# Patient Record
Sex: FEMALE | Race: WHITE | NOT HISPANIC OR LATINO | Employment: UNEMPLOYED | URBAN - METROPOLITAN AREA
[De-identification: names, ages, dates, MRNs, and addresses within clinical notes are randomized per-mention and may not be internally consistent; named-entity substitution may affect disease eponyms.]

---

## 2020-10-27 ENCOUNTER — TRANSFERRED RECORDS (OUTPATIENT)
Dept: MULTI SPECIALTY CLINIC | Facility: CLINIC | Age: 40
End: 2020-10-27

## 2020-10-27 LAB
C TRACH DNA SPEC QL PROBE+SIG AMP: NOT DETECTED
HPV ABSTRACT: NORMAL
N GONORRHOEA DNA SPEC QL PROBE+SIG AMP: NOT DETECTED
PAP-ABSTRACT: NORMAL
SPECIMEN DESCRIP: NORMAL
SPECIMEN DESCRIPTION: NORMAL

## 2021-08-22 ENCOUNTER — APPOINTMENT (OUTPATIENT)
Dept: CT IMAGING | Facility: CLINIC | Age: 41
End: 2021-08-22
Attending: EMERGENCY MEDICINE
Payer: MEDICAID

## 2021-08-22 ENCOUNTER — HOSPITAL ENCOUNTER (EMERGENCY)
Facility: CLINIC | Age: 41
Discharge: HOME | End: 2021-08-22
Attending: EMERGENCY MEDICINE | Admitting: EMERGENCY MEDICINE
Payer: MEDICAID

## 2021-08-22 VITALS
HEART RATE: 73 BPM | RESPIRATION RATE: 16 BRPM | DIASTOLIC BLOOD PRESSURE: 81 MMHG | OXYGEN SATURATION: 100 % | WEIGHT: 220.8 LBS | TEMPERATURE: 98.9 F | SYSTOLIC BLOOD PRESSURE: 102 MMHG

## 2021-08-22 DIAGNOSIS — A87.9 VIRAL MENINGITIS: ICD-10-CM

## 2021-08-22 LAB
% BASOPHILS, CSF: 0 %
ALBUMIN SERPL-MCNC: 3.7 G/DL (ref 3.4–5)
ALP SERPL-CCNC: 50 U/L (ref 40–150)
ALT SERPL W P-5'-P-CCNC: 49 U/L (ref 0–50)
ANION GAP SERPL CALCULATED.3IONS-SCNC: 2 MMOL/L (ref 3–14)
APPEARANCE CSF: CLEAR
AST SERPL W P-5'-P-CCNC: 22 U/L (ref 0–45)
BASOPHILS # BLD AUTO: 0 10E3/UL (ref 0–0.2)
BASOPHILS NFR BLD AUTO: 1 %
BILIRUB SERPL-MCNC: 0.3 MG/DL (ref 0.2–1.3)
BUN SERPL-MCNC: 12 MG/DL (ref 7–30)
C GATTII+NEOFOR DNA CSF QL NAA+NON-PROBE: NEGATIVE
CALCIUM SERPL-MCNC: 8.4 MG/DL (ref 8.5–10.1)
CHLORIDE BLD-SCNC: 103 MMOL/L (ref 94–109)
CMV DNA CSF QL NAA+NON-PROBE: NEGATIVE
CO2 SERPL-SCNC: 28 MMOL/L (ref 20–32)
COLOR CSF: COLORLESS
CREAT SERPL-MCNC: 0.8 MG/DL (ref 0.52–1.04)
CRP SERPL-MCNC: 9.8 MG/L (ref 0–8)
E COLI K1 AG CSF QL: NEGATIVE
EOSINOPHIL # BLD AUTO: 0.2 10E3/UL (ref 0–0.7)
EOSINOPHIL NFR BLD AUTO: 3 %
EOSINOPHIL NFR CSF MANUAL: 1 %
ERYTHROCYTE [DISTWIDTH] IN BLOOD BY AUTOMATED COUNT: 12.1 % (ref 10–15)
EV RNA SPEC QL NAA+PROBE: NEGATIVE
GFR SERPL CREATININE-BSD FRML MDRD: >90 ML/MIN/1.73M2
GLUCOSE BLD-MCNC: 98 MG/DL (ref 70–99)
GLUCOSE CSF-MCNC: 41 MG/DL (ref 40–70)
GP B STREP DNA CSF QL NAA+NON-PROBE: NEGATIVE
HAEM INFLU DNA CSF QL NAA+NON-PROBE: NEGATIVE
HCG SERPL QL: NEGATIVE
HCT VFR BLD AUTO: 40.4 % (ref 35–47)
HGB BLD-MCNC: 13.7 G/DL (ref 11.7–15.7)
HHV6 DNA CSF QL NAA+NON-PROBE: NEGATIVE
HOLD SPECIMEN: NORMAL
HSV1 DNA CSF QL NAA+NON-PROBE: NEGATIVE
HSV2 DNA CSF QL NAA+NON-PROBE: NEGATIVE
IMM GRANULOCYTES # BLD: 0 10E3/UL
IMM GRANULOCYTES NFR BLD: 0 %
L MONOCYTOG DNA CSF QL NAA+NON-PROBE: NEGATIVE
LYMPH ABN NFR CSF MANUAL: 60 %
LYMPHOCYTES # BLD AUTO: 1.9 10E3/UL (ref 0.8–5.3)
LYMPHOCYTES NFR BLD AUTO: 38 %
MCH RBC QN AUTO: 29.3 PG (ref 26.5–33)
MCHC RBC AUTO-ENTMCNC: 33.9 G/DL (ref 31.5–36.5)
MCV RBC AUTO: 86 FL (ref 78–100)
MONOCYTES # BLD AUTO: 0.6 10E3/UL (ref 0–1.3)
MONOCYTES NFR BLD AUTO: 11 %
MONOS+MACROS NFR CSF MANUAL: 32 %
N MEN DNA CSF QL NAA+NON-PROBE: NEGATIVE
NEUTROPHILS # BLD AUTO: 2.3 10E3/UL (ref 1.6–8.3)
NEUTROPHILS NFR BLD AUTO: 47 %
NEUTROPHILS NFR CSF MANUAL: 7 %
NRBC # BLD AUTO: 0 10E3/UL
NRBC BLD AUTO-RTO: 0 /100
OTHER CELLS CSF: 0 %
PARECHOVIRUS A RNA CSF QL NAA+NON-PROBE: NEGATIVE
PLATELET # BLD AUTO: 196 10E3/UL (ref 150–450)
POTASSIUM BLD-SCNC: 3.5 MMOL/L (ref 3.4–5.3)
PROT CSF-MCNC: 66 MG/DL (ref 15–60)
PROT SERPL-MCNC: 7.2 G/DL (ref 6.8–8.8)
RBC # BLD AUTO: 4.68 10E6/UL (ref 3.8–5.2)
RBC # CSF MANUAL: 7 /UL (ref 0–2)
S PNEUM DNA CSF QL NAA+NON-PROBE: NEGATIVE
SARS-COV-2 RNA RESP QL NAA+PROBE: NEGATIVE
SODIUM SERPL-SCNC: 133 MMOL/L (ref 133–144)
TUBE # CSF: 4
VZV DNA CSF QL NAA+NON-PROBE: NEGATIVE
WBC # BLD AUTO: 4.9 10E3/UL (ref 4–11)
WBC # CSF MANUAL: 92 /UL (ref 0–5)

## 2021-08-22 PROCEDURE — 87483 CNS DNA AMP PROBE TYPE 12-25: CPT | Performed by: EMERGENCY MEDICINE

## 2021-08-22 PROCEDURE — 87070 CULTURE OTHR SPECIMN AEROBIC: CPT | Performed by: EMERGENCY MEDICINE

## 2021-08-22 PROCEDURE — 250N000009 HC RX 250: Performed by: EMERGENCY MEDICINE

## 2021-08-22 PROCEDURE — 96372 THER/PROPH/DIAG INJ SC/IM: CPT | Mod: 59 | Performed by: EMERGENCY MEDICINE

## 2021-08-22 PROCEDURE — 82040 ASSAY OF SERUM ALBUMIN: CPT | Performed by: EMERGENCY MEDICINE

## 2021-08-22 PROCEDURE — 86140 C-REACTIVE PROTEIN: CPT | Performed by: EMERGENCY MEDICINE

## 2021-08-22 PROCEDURE — 258N000003 HC RX IP 258 OP 636: Performed by: EMERGENCY MEDICINE

## 2021-08-22 PROCEDURE — 84155 ASSAY OF PROTEIN SERUM: CPT | Performed by: EMERGENCY MEDICINE

## 2021-08-22 PROCEDURE — 96361 HYDRATE IV INFUSION ADD-ON: CPT | Mod: 59

## 2021-08-22 PROCEDURE — 99285 EMERGENCY DEPT VISIT HI MDM: CPT | Mod: 25 | Performed by: EMERGENCY MEDICINE

## 2021-08-22 PROCEDURE — 87635 SARS-COV-2 COVID-19 AMP PRB: CPT | Performed by: EMERGENCY MEDICINE

## 2021-08-22 PROCEDURE — 36415 COLL VENOUS BLD VENIPUNCTURE: CPT | Performed by: EMERGENCY MEDICINE

## 2021-08-22 PROCEDURE — 96375 TX/PRO/DX INJ NEW DRUG ADDON: CPT

## 2021-08-22 PROCEDURE — 86618 LYME DISEASE ANTIBODY: CPT | Performed by: EMERGENCY MEDICINE

## 2021-08-22 PROCEDURE — 84157 ASSAY OF PROTEIN OTHER: CPT | Performed by: EMERGENCY MEDICINE

## 2021-08-22 PROCEDURE — C9803 HOPD COVID-19 SPEC COLLECT: HCPCS

## 2021-08-22 PROCEDURE — 82945 GLUCOSE OTHER FLUID: CPT | Performed by: EMERGENCY MEDICINE

## 2021-08-22 PROCEDURE — 62270 DX LMBR SPI PNXR: CPT | Performed by: EMERGENCY MEDICINE

## 2021-08-22 PROCEDURE — 70450 CT HEAD/BRAIN W/O DYE: CPT | Mod: XS

## 2021-08-22 PROCEDURE — 250N000011 HC RX IP 250 OP 636: Performed by: EMERGENCY MEDICINE

## 2021-08-22 PROCEDURE — 96374 THER/PROPH/DIAG INJ IV PUSH: CPT

## 2021-08-22 PROCEDURE — 62270 DX LMBR SPI PNXR: CPT

## 2021-08-22 PROCEDURE — 85025 COMPLETE CBC W/AUTO DIFF WBC: CPT | Performed by: EMERGENCY MEDICINE

## 2021-08-22 PROCEDURE — 99285 EMERGENCY DEPT VISIT HI MDM: CPT | Mod: 25

## 2021-08-22 PROCEDURE — 96376 TX/PRO/DX INJ SAME DRUG ADON: CPT | Mod: 59

## 2021-08-22 PROCEDURE — 84703 CHORIONIC GONADOTROPIN ASSAY: CPT | Performed by: EMERGENCY MEDICINE

## 2021-08-22 PROCEDURE — 70496 CT ANGIOGRAPHY HEAD: CPT

## 2021-08-22 PROCEDURE — 89051 BODY FLUID CELL COUNT: CPT | Performed by: EMERGENCY MEDICINE

## 2021-08-22 RX ORDER — METOCLOPRAMIDE HYDROCHLORIDE 5 MG/ML
10 INJECTION INTRAMUSCULAR; INTRAVENOUS EVERY 6 HOURS
Status: DISCONTINUED | OUTPATIENT
Start: 2021-08-22 | End: 2021-08-22 | Stop reason: HOSPADM

## 2021-08-22 RX ORDER — LAMOTRIGINE 200 MG/1
200 TABLET ORAL DAILY
COMMUNITY

## 2021-08-22 RX ORDER — CEPHALEXIN 500 MG/1
500 CAPSULE ORAL 3 TIMES DAILY
COMMUNITY

## 2021-08-22 RX ORDER — OLANZAPINE 10 MG/2ML
5 INJECTION, POWDER, FOR SOLUTION INTRAMUSCULAR ONCE
Status: COMPLETED | OUTPATIENT
Start: 2021-08-22 | End: 2021-08-22

## 2021-08-22 RX ORDER — HYDROXYZINE HYDROCHLORIDE 25 MG/1
25 TABLET, FILM COATED ORAL
COMMUNITY

## 2021-08-22 RX ORDER — DIPHENHYDRAMINE HYDROCHLORIDE 50 MG/ML
25 INJECTION INTRAMUSCULAR; INTRAVENOUS ONCE
Status: COMPLETED | OUTPATIENT
Start: 2021-08-22 | End: 2021-08-22

## 2021-08-22 RX ORDER — FEXOFENADINE HCL 180 MG/1
180 TABLET ORAL DAILY
COMMUNITY

## 2021-08-22 RX ORDER — CLONAZEPAM 0.5 MG/1
0.5 TABLET ORAL 2 TIMES DAILY PRN
COMMUNITY

## 2021-08-22 RX ORDER — FLUVOXAMINE MALEATE 100 MG
225 TABLET ORAL AT BEDTIME
COMMUNITY

## 2021-08-22 RX ORDER — IBUPROFEN 800 MG/1
800 TABLET, FILM COATED ORAL EVERY 8 HOURS PRN
COMMUNITY

## 2021-08-22 RX ORDER — KETOROLAC TROMETHAMINE 15 MG/ML
15 INJECTION, SOLUTION INTRAMUSCULAR; INTRAVENOUS ONCE
Status: COMPLETED | OUTPATIENT
Start: 2021-08-22 | End: 2021-08-22

## 2021-08-22 RX ORDER — METOCLOPRAMIDE HYDROCHLORIDE 5 MG/ML
10 INJECTION INTRAMUSCULAR; INTRAVENOUS ONCE
Status: COMPLETED | OUTPATIENT
Start: 2021-08-22 | End: 2021-08-22

## 2021-08-22 RX ORDER — IOPAMIDOL 755 MG/ML
100 INJECTION, SOLUTION INTRAVASCULAR ONCE
Status: COMPLETED | OUTPATIENT
Start: 2021-08-22 | End: 2021-08-22

## 2021-08-22 RX ORDER — PROPRANOLOL HYDROCHLORIDE 20 MG/1
20 TABLET ORAL 2 TIMES DAILY
COMMUNITY

## 2021-08-22 RX ORDER — ACETAMINOPHEN 325 MG/1
325-650 TABLET ORAL EVERY 6 HOURS PRN
COMMUNITY

## 2021-08-22 RX ADMIN — IOPAMIDOL 70 ML: 755 INJECTION, SOLUTION INTRAVENOUS at 04:43

## 2021-08-22 RX ADMIN — SODIUM CHLORIDE 80 ML: 9 INJECTION, SOLUTION INTRAVENOUS at 04:49

## 2021-08-22 RX ADMIN — METOCLOPRAMIDE HYDROCHLORIDE 10 MG: 5 INJECTION INTRAMUSCULAR; INTRAVENOUS at 05:59

## 2021-08-22 RX ADMIN — SODIUM CHLORIDE 1000 ML: 9 INJECTION, SOLUTION INTRAVENOUS at 03:39

## 2021-08-22 RX ADMIN — KETOROLAC TROMETHAMINE 15 MG: 15 INJECTION, SOLUTION INTRAMUSCULAR; INTRAVENOUS at 13:00

## 2021-08-22 RX ADMIN — KETOROLAC TROMETHAMINE 15 MG: 15 INJECTION, SOLUTION INTRAMUSCULAR; INTRAVENOUS at 05:59

## 2021-08-22 RX ADMIN — SODIUM CHLORIDE 1000 ML: 9 INJECTION, SOLUTION INTRAVENOUS at 06:05

## 2021-08-22 RX ADMIN — OLANZAPINE 5 MG: 10 INJECTION, POWDER, LYOPHILIZED, FOR SOLUTION INTRAMUSCULAR at 03:47

## 2021-08-22 RX ADMIN — METOCLOPRAMIDE HYDROCHLORIDE 10 MG: 5 INJECTION INTRAMUSCULAR; INTRAVENOUS at 13:00

## 2021-08-22 RX ADMIN — DIPHENHYDRAMINE HYDROCHLORIDE 25 MG: 50 INJECTION, SOLUTION INTRAMUSCULAR; INTRAVENOUS at 05:59

## 2021-08-22 NOTE — ED NOTES
SIGN-OUT:  - Assumed care of this patient from Dr. Stuart  - Pending at shift change: Awaiting CSF studies.  - Tentative plan from original EM Physician: Dispo pending CSF studies    UPDATES / REASSESSMENT:  - Results:   Patient's CSF studies with a nucleated cell count of 90, 60% lymphocytes.  Consistent with a viral meningitis.  Gram stain negative.   -Tested for Covid which was negative.   - Reassessment:   -She noted improvement of her headache; still present but significantly improved.  Improved neck stiffness and normal range of motion.  Normal mentation no confusion.  --- No acute issues or interventions necessary while still in the emergency department.    DISCUSSIONS:  - w/ Dr. Watkins (ID staff on call): Discussed if patient could discharge while waiting PCR's versus empiric antiviral coverage until her HSV PCR was negative.  She is he felt that given lack of signs or symptoms of encephalitis and patient's reliability, family members to be by her inability to return did not feel that she needed to get empiric coverage or admission until PCR's are resulted.  Recommended home with symptomatic management and close PCP follow-up to follow-up on PCR's.  She also recommended adding Lyme to CSF which was added.  - w/ Patient: Understands need for supervision and close PCP follow-up in the next 24 to 48 hours to go over CSF PSR results.  --- Reviewed available findings, discussions/recommendations, plan, need for and importance of close follow-up, strict return/safety precautions.       DISPOSITION:  - IMPRESSION: Viral meningitis  - DISPOSITION: Discharge home with 24 to 48-hour PCP follow-up  - FOLLOW-UP: PCP 24 to 48 hours  - PENDING: PCR studies from her CSF.    MD Barbie Perez, Josep Greer MD  08/25/21 [id number]

## 2021-08-22 NOTE — DISCHARGE INSTRUCTIONS
Your evaluation is consistent with a viral meningitis. These are treated predominantly with managing your symptoms. Recommend Tylenol, ibuprofen, caffeine and lying flat for headache management. Please make sure someone is with you if there is any concern for worsening headache or mental status changes (confusion, neurologic changes, etc.) please come back to the emergency department right away.    Please see your primary care doctor in 1 to 2 days. It is important that someone follow-up on your PCR tests and they can follow-up on imaging findings as well (question of tiny aneurysm versus normal variant). Can discuss this with your primary care doctor (though this conversation can be non emergent).     Your Covid test today was negative.

## 2021-08-22 NOTE — ED PROVIDER NOTES
History     Chief Complaint   Patient presents with     Headache     HA started Thursday night, intermittent Friday, and fairly consistent today.     Back Pain     Thoracic back pain new onset     Torticollis     new onset of neck stifness     HPI  Hope Florence is a 41 year old female with PMH notable for RAYMOND, recent leg cellulitis who presents to the ED with headache.  Patient reports symptom onset 2.5 days ago.  Headache was initially more mild and intermittent.  Onset was gradual.  For the past 24 hours or so the headache has been constant and severe.  Patient has tried acetaminophen, ibuprofen, [place] pot, Excedrin, caffeine without improvement.  Patient endorses fever to 101F earlier this past day.  She endorses pain and some stiffness on the left side of her neck and mid thoracic back.  She had 1 episode of vomiting this past morning.  She endorses some clear nasal discharge, no cough.     Past Medical History  Past Medical History:   Diagnosis Date     Depressive disorder      OCD (obsessive compulsive disorder)      OCD (obsessive compulsive disorder)      Past Surgical History:   Procedure Laterality Date     ENT SURGERY      wisdom teeth extraction     GYN SURGERY      Bartholin cysts     acetaminophen (TYLENOL) 325 MG tablet  cephALEXin (KEFLEX) 500 MG capsule  clonazePAM (KLONOPIN) 0.5 MG tablet  fexofenadine (ALLEGRA) 180 MG tablet  fluvoxaMINE (LUVOX) 100 MG tablet  hydrOXYzine (ATARAX) 25 MG tablet  ibuprofen (ADVIL/MOTRIN) 800 MG tablet  lamoTRIgine (LAMICTAL) 200 MG tablet  propranolol (INDERAL) 20 MG tablet      Allergies   Allergen Reactions     Bactrim [Sulfamethoxazole W/Trimethoprim] Nausea and Vomiting, Itching and Rash     Niacin Nausea and Vomiting     Cephalexin Itching and Rash     Social History   Social History     Tobacco Use     Smoking status: Never Smoker     Smokeless tobacco: Never Used   Substance Use Topics     Alcohol use: Yes     Comment: rarely     Drug use: Yes      Frequency: 2.0 times per week     Types: Marijuana      Past medical history and social history were reviewed with the patient. Additional pertinent items: had rash after starting cephalexin and discontinued it on 8/19/2021     Review of Systems  A complete review of systems was performed with pertinent positives and negatives noted in the HPI, and all other systems negative.    Physical Exam   BP: (!) 123/93  Pulse: 79  Temp: 98.9  F (37.2  C)  Resp: 16  Weight: 100.2 kg (220 lb 12.8 oz)  SpO2: 100 %    Physical Exam  General: uncomfortable appearing. Appears stated age.   HENT: MMM, no oropharyngeal lesions  Eyes: PERRL, normal sclerae  Neck: left paraspinal tenderness present, supple, full ROM with mild discomfort  Cardio: regular rate. Regular rhythm. Extremities well perfused  Resp: Normal work of breathing, normal respiratory rate.  Abdomen: no tenderness, non-distended, no rebound, no guarding  Neuro: alert and fully oriented. CN II-XII grossly intact. Grossly normal strength and sensation in all extremities.   MSK: no deformities. Grossly normal ROM in extremities.   Integumentary/Skin: no rash visualized, normal color  Psych: normal affect, normal behavior    ED Course      [facility]    -Lumbar Puncture    Date/Time: 8/22/2021 3:27 AM  Performed by: Erasmo Stuart MD  Authorized by: Erasmo Stuart MD       PRE-PROCEDURE DETAILS:     Procedure purpose:  Diagnostic    ANESTHESIA (see MAR for exact dosages):     Anesthesia method:  Local infiltration    Local anesthetic:  Lidocaine 1% WITH epi      PROCEDURE DETAILS:     Lumbar space:  L3-L4 interspace    Patient position:  L lateral decubitus    Needle gauge:  20    Needle type:  Spinal needle - Quincke tip    Needle length (in):  3.5    Ultrasound guidance: no      Number of attempts:  2    Opening pressure (cm H2O):  20    Fluid appearance:  Blood-tinged then clearing    Tubes of fluid:  4     Total volume (ml):  5    POST-PROCEDURE:     Puncture site:  Adhesive bandage applied and direct pressure applied      PROCEDURE   Patient Tolerance:  Patient tolerated the procedure well with no immediate complications                Labs Ordered and Resulted from Time of ED Arrival Up to the Time of Departure from the ED   COMPREHENSIVE METABOLIC PANEL - Abnormal; Notable for the following components:       Result Value    Anion Gap 2 (*)     Calcium 8.4 (*)     All other components within normal limits   CRP INFLAMMATION - Abnormal; Notable for the following components:    CRP Inflammation 9.8 (*)     All other components within normal limits   PROTEIN TOTAL CSF - Abnormal; Notable for the following components:    Protein total CSF 66 (*)     All other components within normal limits   GLUCOSE CSF - Normal    Narrative:     CSF glucose concentrations are about 60 percent of normal plasma glucose.  CSF glucose concentrations are about 60 percent of normal plasma glucose.   HCG QUALITATIVE PREGNANCY - Normal   CBC WITH PLATELETS & DIFFERENTIAL    Narrative:     The following orders were created for panel order CBC with platelets differential.  Procedure                               Abnormality         Status                     ---------                               -----------         ------                     CBC with platelets and d...[[id number]]                      Final result                 Please view results for these tests on the individual orders.   CBC WITH PLATELETS AND DIFFERENTIAL   EXTRA RED TOP TUBE   CELL COUNT CSF   CELL COUNT CSF   PERIPHERAL IV CATHETER   GRAM STAIN   AEROBIC BACTERIAL CULTURE ROUTINE   MENINGITIS/ENCEPHALITIS PANEL QUAL PCR CSF   EXTRA TUBE    Narrative:     The following orders were created for panel order [place] Draw.  Procedure                               Abnormality         Status                     ---------                               -----------         ------                      Extra Red Top Tube[[id number]]                               Final result                 Please view results for these tests on the individual orders.   CELL COUNT WITH DIFFERENTIAL CSF    Narrative:     The following orders were created for panel order CSF Cell Count with Differential:.  Procedure                               Abnormality         Status                     ---------                               -----------         ------                     Cell Count CSF[[id number]]                                   In process                   Please view results for these tests on the individual orders.   CELL COUNT WITH DIFFERENTIAL CSF    Narrative:     The following orders were created for panel order CSF Cell Count with Differential:.  Procedure                               Abnormality         Status                     ---------                               -----------         ------                     Cell Count CSF[[id number]]                                   In process                   Please view results for these tests on the individual orders.     CTA Head with Contrast   Final Result   IMPRESSION:    HEAD CT:   1.  No acute intracranial process.      HEAD CTA:    1.  No flow-limiting stenosis or proximal occlusion.   2.  Tiny 1 mm outpouching along the posterior margin of the left internal carotid artery may reflect a tiny aneurysm versus a prominent infundibulum.         CT Head w/o Contrast   Final Result   IMPRESSION:    HEAD CT:   1.  No acute intracranial process.      HEAD CTA:    1.  No flow-limiting stenosis or proximal occlusion.   2.  Tiny 1 mm outpouching along the posterior margin of the left internal carotid artery may reflect a tiny aneurysm versus a prominent infundibulum.                Assessments & Plan (with Medical Decision Making)   Patient presenting with intractable headache. Vitals in the ED unremarkable. Exam non-focal. Nursing notes reviewed. Initial  differential diagnosis includes but not limited to meningitis, tension headache, migraine headache, sinusitis, aneurismal SAH.     CT head angio demonstrated a small 1 mm outpouching along the posterior margin of the left ICA, no other significant findings.      Patient's report of intractable headache with neck pain and fever raised suspicion for meningitis. Discussed the risks, benefits, indications, and alternatives of lumbar puncture with the patient. The patient demonstrated understanding and capacity and elected to proceed with the procedure. LP performed as detailed above without complication.     In the ED, the patient's symptoms were managed with NS bolus and olanzapine, with minimal improvement in symptoms upon reassessment. Ketorolac, metoclopramide, and diphenhydramine then given for further headache control. Patient reported headache improved 50%.     Patient signed out to oncoming provider with plan for f/u of CSF studies, dispo dependent on CSF results.       Final diagnoses:   None     New Prescriptions    No medications on file       --  Erasmo Stuart MD   Emergency Medicine   [facility] EMERGENCY DEPARTMENT  8/22/2021     Erasmo Stuart MD  08/22/21 0370

## 2021-08-24 LAB — B BURGDOR AB CSF IA-ACNC: 0.08 LIV

## 2021-08-27 ENCOUNTER — TELEPHONE (OUTPATIENT)
Dept: EMERGENCY MEDICINE | Facility: CLINIC | Age: 41
End: 2021-08-27

## 2021-08-27 LAB
BACTERIA CSF CULT: NO GROWTH
GRAM STAIN RESULT: NORMAL
GRAM STAIN RESULT: NORMAL

## 2021-08-27 NOTE — TELEPHONE ENCOUNTER
[facility] Emergency Department/Urgent Care Lab result notification     Patient/parent Name  Hope    RN Assessment (Patient s current Symptoms), include time called.  [Insert Left message here if message left]  3:11 Patient calling for results of lumbar puncture.  She is reporting that her headache is better but she states that her head feels like it is vibrating, comes in waves. Just nauseated.  Temps are 99  Patient wonders if she needs to be seen in ER again or what her next steps are.   She states that nothing is worse but not better.  Patient has insurance and money issues so would like to do a virtual urgent care visit if possible, she has not been able to get into her regular clinic.  Warm transferred to scheduling, patient should be able to schedule a telephone appointment with a regular provider. Did advise ER if she feels she is worsening in the interim. Patient stated understanding.          Marli Bland, PRANEETH  [facility] [employer] [place]  Emergency Dept Lab Result RN  Ph# [phone]

## 2021-08-30 ENCOUNTER — DOCUMENTATION ONLY (OUTPATIENT)
Dept: FAMILY MEDICINE | Facility: CLINIC | Age: 41
End: 2021-08-30

## 2021-08-30 ENCOUNTER — VIRTUAL VISIT (OUTPATIENT)
Dept: FAMILY MEDICINE | Facility: CLINIC | Age: 41
End: 2021-08-30
Payer: MEDICAID

## 2021-08-30 DIAGNOSIS — A87.9 VIRAL MENINGITIS: Primary | ICD-10-CM

## 2021-08-30 DIAGNOSIS — F41.1 GENERALIZED ANXIETY DISORDER: ICD-10-CM

## 2021-08-30 PROCEDURE — 99203 OFFICE O/P NEW LOW 30 MIN: CPT | Mod: 95 | Performed by: PHYSICIAN ASSISTANT

## 2021-08-30 NOTE — PROGRESS NOTES
Patient is being evaluated via a billable video visit.      How would you like to obtain your AVS? Mail a copy       Video Start Time: 755    Assessment & Plan   Benign exam, clinically improving, discussed not unusual 1 week out from viral meningitis to be having some residual symptoms.   Being off then on her SSRI could also be contributing.    Problem List Items Addressed This Visit     Generalized anxiety disorder      Other Visit Diagnoses     Viral meningitis    -  Primary           Review of prior external note(s) from - Outside records from recent ED visit and labs       25 minutes spent on the date of the encounter doing chart review, history and exam, documentation and further activities as noted above          Return in about 3 weeks (around 9/20/2021) for PCP Follow-up.    REHAN Yu  [facility]    Subjective     Presents presents to clinic today for the following health issues     HPI   Seen in ED 8/22 with headache, possible fevers- had spinal tap c/w viral meningitis.  Generally improved but still gets some HAs sometimes.   She was also out of one of her meds (fluvoxamine)  for about a week prior to those symptoms,.  Still having some photophobia.      She will be establishing with [facility] in the near future.      Has been following with psych for RAYMOND        Review of Systems   NEURO HAs as above      Objective           Vitals:  No vitals were obtained today due to virtual visit.    Physical Exam   GENERAL: Healthy, alert and no distress  EYES: Eyes grossly normal to inspection.  No discharge or erythema, or obvious scleral/conjunctival abnormalities.  RESP: No audible wheeze, cough, or visible cyanosis.  No visible retractions or increased work of breathing.    SKIN: Visible skin clear. No significant rash, abnormal pigmentation or lesions.  NEURO: Cranial nerves grossly intact.  Mentation and speech appropriate for age.  PSYCH: Mentation appears normal,  affect normal/bright, judgement and insight intact, normal speech and appearance well-groomed.    Admission on 08/22/2021, Discharged on 08/22/2021   Component Date Value Ref Range Status     Sodium 08/22/2021 133  133 - 144 mmol/L Final     Potassium 08/22/2021 3.5  3.4 - 5.3 mmol/L Final     Chloride 08/22/2021 103  94 - 109 mmol/L Final     Carbon Dioxide (CO2) 08/22/2021 28  20 - 32 mmol/L Final     Anion Gap 08/22/2021 2* 3 - 14 mmol/L Final     Urea Nitrogen 08/22/2021 12  7 - 30 mg/dL Final     Creatinine 08/22/2021 0.80  0.52 - 1.04 mg/dL Final     Calcium 08/22/2021 8.4* 8.5 - 10.1 mg/dL Final     Glucose 08/22/2021 98  70 - 99 mg/dL Final     Alkaline Phosphatase 08/22/2021 50  40 - 150 U/L Final     AST 08/22/2021 22  0 - 45 U/L Final     ALT 08/22/2021 49  0 - 50 U/L Final     Protein Total 08/22/2021 7.2  6.8 - 8.8 g/dL Final     Albumin 08/22/2021 3.7  3.4 - 5.0 g/dL Final     Bilirubin Total 08/22/2021 0.3  0.2 - 1.3 mg/dL Final     GFR Estimate 08/22/2021 >90  >60 mL/min/1.73m2 Final    As of July 11, 2021, eGFR is calculated by the CKD-EPI creatinine equation, without race adjustment. eGFR can be influenced by muscle mass, exercise, and diet. The reported eGFR is an estimation only and is only applicable if the renal function is stable.     CRP Inflammation 08/22/2021 9.8* 0.0 - 8.0 mg/L Final     Glucose CSF 08/22/2021 41  40 - 70 mg/dL Final     Protein total CSF 08/22/2021 66* 15 - 60 mg/dL Final     Culture 08/22/2021 No Growth   Final     Gram Stain Result 08/22/2021 No organisms seen   Final     Gram Stain Result 08/22/2021 3+ WBC seen   Final    Predominantly mononuclear cells     Escherichia coli K1 08/22/2021 Negative  Negative Final     Haemophilus influenzae 08/22/2021 Negative  Negative Final     Listeria monocytogenes 08/22/2021 Negative  Negative Final     Neisseria meningitidis 08/22/2021 Negative  Negative Final     Streptococcus agalactiae (GBS) 08/22/2021 Negative  Negative Final      Streptococcus pneumoniae 08/22/2021 Negative  Negative Final     Cytomegalovirus 08/22/2021 Negative  Negative Final     Enterovirus 08/22/2021 Negative  Negative Final     Herpes simplex virus 1 08/22/2021 Negative  Negative Final    Recommend testing with another molecular method if clinical suspicion for infection is high.     Herpes simplex virus 2 08/22/2021 Negative  Negative Final    Recommend testing with another molecular method if clinical suspicion for infection is high.     Human Herpes Virus 6 08/22/2021 Negative  Negative Final     Human parechovirus 08/22/2021 Negative  Negative Final     Varicella zoster virus 08/22/2021 Negative  Negative Final     Cryptococcus neoformans/gattii 08/22/2021 Negative  Negative Final    Recommend testing for Cryptococcal antigen and fungal culture if clinical suspicion for infection is high.     WBC Count 08/22/2021 4.9  4.0 - 11.0 10e3/uL Final     RBC Count 08/22/2021 4.68  3.80 - 5.20 10e6/uL Final     Hemoglobin 08/22/2021 13.7  11.7 - 15.7 g/dL Final     Hematocrit 08/22/2021 40.4  35.0 - 47.0 % Final     MCV 08/22/2021 86  78 - 100 fL Final     MCH 08/22/2021 29.3  26.5 - 33.0 pg Final     MCHC 08/22/2021 33.9  31.5 - 36.5 g/dL Final     RDW 08/22/2021 12.1  10.0 - 15.0 % Final     Platelet Count 08/22/2021 196  150 - 450 10e3/uL Final     % Neutrophils 08/22/2021 47  % Final     % Lymphocytes 08/22/2021 38  % Final     % Monocytes 08/22/2021 11  % Final     % Eosinophils 08/22/2021 3  % Final     % Basophils 08/22/2021 1  % Final     % Immature Granulocytes 08/22/2021 0  % Final     NRBCs per 100 WBC 08/22/2021 0  <1 /100 Final     Absolute Neutrophils 08/22/2021 2.3  1.6 - 8.3 10e3/uL Final     Absolute Lymphocytes 08/22/2021 1.9  0.8 - 5.3 10e3/uL Final     Absolute Monocytes 08/22/2021 0.6  0.0 - 1.3 10e3/uL Final     Absolute Eosinophils 08/22/2021 0.2  0.0 - 0.7 10e3/uL Final     Absolute Basophils 08/22/2021 0.0  0.0 - 0.2 10e3/uL Final     Absolute  Immature Granulocytes 08/22/2021 0.0  <=0.0 10e3/uL Final     Absolute NRBCs 08/22/2021 0.0  10e3/uL Final     Hold Specimen 08/22/2021 JIC   Final     hCG Serum Qualitative 08/22/2021 Negative  Negative Final    This test is for screening purposes.  Results should be interpreted along with the clinical picture.  Confirmation testing is available if warranted by ordering [id number], HCG Quantitative Pregnancy.     Tube Number 08/22/2021 4   Final     Color 08/22/2021 Colorless  Colorless Final     Clarity 08/22/2021 Clear  Clear Final     Total Nucleated Cells 08/22/2021 92* 0 - 5 /uL Final     RBC Count 08/22/2021 7* 0 - 2 /uL Final     % Neutrophils 08/22/2021 7  % Final     % Lymphocytes 08/22/2021 60  % Final     % Monocytes/Macrophages 08/22/2021 32  % Final     % Eosinophils 08/22/2021 1  % Final     % Basophils 08/22/2021 0  % Final     % Other Cells 08/22/2021 0  % Final     SARS CoV2 PCR 08/22/2021 Negative  Negative Final    NEGATIVE: SARS-CoV-2 (COVID-19) RNA not detected, presumed negative.     Lyme CSF [place] 08/22/2021 0.08  <=0.99 HECTOR Final    Comment: INTERPRETIVE INFORMATION: Borrelia burgdorferi Abs, PAOLA,   CSF                            0.99 HECTOR or less: ......... Negative - Antibody to                                B. burgdorferi not detected.    1.00 - 1.20 HECTOR ........... Equivocal - Repeat testing                                in 10-14 days may be helpful.    1.21 HECTOR or greater: ...... Positive - Probable presence                                of antibody to B. burgdorferi                                detected.    The detection of antibodies to B. burgdorferi in   cerebrospinal fluid may indicate central nervous system   infection.  However, consideration must be given to   possible contamination by blood or transfer of serum   antibodies across the blood-brain barrier.    Current CDC recommendations for the serologic diagnosis of   Lyme disease are to screen with a polyvalent PAOLA test  and   confirm equivocal and positive results with immunoblot.    Both IgM and IgG immunoblots should be performed on samples   less                            than 4 weeks after appearance of erythema migrans.    Only IgG immunoblot should be performed on samples greater   than 4 weeks after the disease onset.  IgM immunoblot in   the chronic stage is not recommended and does not aid in   the diagnosis of neuroborreliosis or chronic Lyme disease.    Please submit requests for appropriate immunoblot testing   within 10 days.     This test was developed and its performance characteristics   determined by [employer]. It has not been cleared or   approved by the US Food and Drug Administration. This test   was performed in a CLIA certified laboratory and is   intended for clinical purposes.              Video-Visit Details    Type of service:  Video Visit    Video End Time:8:08 AM    Originating Location (pt. Location): Home    Distant Location (provider location):  [facility]     Platform used for Video Visit: [employer]

## 2021-08-30 NOTE — PATIENT INSTRUCTIONS
Patient Education     Viral Meningitis   Meningitis is an inflammation of the lining (meninges) that covers your brain and spinal cord. It may cause headache, stiff neck, irritability, fever, drowsiness, nausea, and vomiting.  Most cases of meningitis are caused by infections from bacteria or viruses. Bacterial meningitis is often more serious. It may cause permanent complications. You would need to be treated in the hospital with antibiotics. But your tests show that you have viral meningitis. This is usually much less serious. It rarely causes any complications. You can take care of a mild case at home. Some cases of viral meningitis need antiviral medicines.  Most cases of viral meningitis are passed from person to person through coughing, sneezing, and close contact. West Nile virus is a rare cause of viral meningitis. It's passed by mosquito bites.  Antibiotics are not used to treat viral meningitis. You may be given other medicines to treat your symptoms. It will take 2 to 7 days to recover from viral meningitis. You may have headaches that come and go for up to 2 weeks.  In rare cases, what looks like viral meningitis may turn out to be early bacterial meningitis. That s why it s important to be rechecked. Call your healthcare provider if your symptoms get worse or new symptoms appear.  Home care  Follow these tips when taking care of yourself at home:    Rest in bed until you are feeling better. Stay home from school or work for at least 7 days, or until all symptoms are gone.    Use acetaminophen or ibuprofen for fever and to relieve pain, unless another pain medicine was prescribed. Talk with your healthcare provider before taking these medicines if you have chronic liver or kidney disease.. Also talk with your provider if you ve had a stomach ulcer or GI (gastrointestinal) bleeding. Don t give aspirin to anyone younger than 18 years old who is ill with a fever. It may cause severe liver damage.    If  you have a fever, drink extra water, sports drinks, or other fluids. This will keep you from getting dehydrated.    Wash your hands often with soap and water to prevent spreading the infection.  Follow-up care  Follow up with your healthcare provider, or as advised. This is to make sure you are getting better as expected.  When to seek medical advice  Call your healthcare provider right away if any of these occur:    Headache or stiff neck that gets worse    Drowsiness, confusion, or bizarre behavior    You can t keep fluids down because of vomiting    Fever of 100.4 F (38 C) or higher, or as advised    Weakness or numbness in an arm or leg    Trouble speaking, swallowing, or walking    Seizure  [employer] last reviewed this educational content on 9/1/2019 2000-2021 [employer]. All rights reserved. This information is not intended as a substitute for professional medical care. Always follow your healthcare professional's instructions.

## 2023-06-09 ENCOUNTER — TRANSFERRED RECORDS (OUTPATIENT)
Dept: HEALTH INFORMATION MANAGEMENT | Facility: CLINIC | Age: 43
End: 2023-06-09
Payer: COMMERCIAL

## 2023-06-09 ENCOUNTER — MEDICAL CORRESPONDENCE (OUTPATIENT)
Dept: HEALTH INFORMATION MANAGEMENT | Facility: CLINIC | Age: 43
End: 2023-06-09
Payer: COMMERCIAL

## 2023-06-09 ENCOUNTER — TRANSCRIBE ORDERS (OUTPATIENT)
Dept: MATERNAL FETAL MEDICINE | Facility: CLINIC | Age: 43
End: 2023-06-09
Payer: COMMERCIAL

## 2023-06-09 DIAGNOSIS — O26.90 PREGNANCY RELATED CONDITION, ANTEPARTUM: Primary | ICD-10-CM

## 2023-06-13 ENCOUNTER — PRE VISIT (OUTPATIENT)
Dept: MATERNAL FETAL MEDICINE | Facility: CLINIC | Age: 43
End: 2023-06-13
Payer: COMMERCIAL

## 2023-06-20 ENCOUNTER — OFFICE VISIT (OUTPATIENT)
Dept: MATERNAL FETAL MEDICINE | Facility: CLINIC | Age: 43
End: 2023-06-20
Attending: ADVANCED PRACTICE MIDWIFE
Payer: COMMERCIAL

## 2023-06-20 ENCOUNTER — HOSPITAL ENCOUNTER (OUTPATIENT)
Dept: ULTRASOUND IMAGING | Facility: CLINIC | Age: 43
Discharge: HOME | End: 2023-06-20
Attending: ADVANCED PRACTICE MIDWIFE
Payer: COMMERCIAL

## 2023-06-20 ENCOUNTER — LAB (OUTPATIENT)
Dept: LAB | Facility: CLINIC | Age: 43
End: 2023-06-20
Attending: ADVANCED PRACTICE MIDWIFE
Payer: COMMERCIAL

## 2023-06-20 ENCOUNTER — MEDICAL CORRESPONDENCE (OUTPATIENT)
Dept: HEALTH INFORMATION MANAGEMENT | Facility: CLINIC | Age: 43
End: 2023-06-20

## 2023-06-20 DIAGNOSIS — O26.90 PREGNANCY RELATED CONDITION, ANTEPARTUM: ICD-10-CM

## 2023-06-20 DIAGNOSIS — O09.521 MULTIGRAVIDA OF ADVANCED MATERNAL AGE IN FIRST TRIMESTER: ICD-10-CM

## 2023-06-20 DIAGNOSIS — O26.90: ICD-10-CM

## 2023-06-20 DIAGNOSIS — O09.521 MULTIGRAVIDA OF ADVANCED MATERNAL AGE IN FIRST TRIMESTER: Primary | ICD-10-CM

## 2023-06-20 DIAGNOSIS — O09.521 MULTIGRAVIDA OF ADVANCED MATERNAL AGE, FIRST TRIMESTER: Primary | ICD-10-CM

## 2023-06-20 PROCEDURE — 96040 HC GENETIC COUNSELING, EACH 30 MINUTES: CPT | Performed by: GENETIC COUNSELOR, MS

## 2023-06-20 PROCEDURE — 76813 OB US NUCHAL MEAS 1 GEST: CPT

## 2023-06-20 PROCEDURE — 76801 OB US < 14 WKS SINGLE FETUS: CPT | Mod: 26 | Performed by: OBSTETRICS & GYNECOLOGY

## 2023-06-20 PROCEDURE — 36415 COLL VENOUS BLD VENIPUNCTURE: CPT

## 2023-06-20 NOTE — PROGRESS NOTES
"[facility]  Genetic Counseling Consult    Patient:  Hope Florence YOB: 1980   Date of Service:  23   MRN: [id number]    Hope was seen at the [facility] [place] for genetic counseling consultation due to advanced maternal age to discuss the options for testing for fetal chromosome abnormalities. The patient was unaccompanied to today's visit.     IMPRESSION/ PLAN   1. Hope elected to proceed with NT ultrasound and NIPT through [facility]. She opted to screen for sex chromosome aneuploidies including fetal sex. Results are expected in 10-14 days. Hope provided verbal permission for results to be left on her voicemail. She requested the results include predicted fetal sex information.  Patient was informed that results will also be available via [employer].    2.  Maternal serum AFP (single marker screen) is recommended after 15 weeks to screen for open neural tube defects. A quad screen should not be performed.    3.  An 18-20 week comprehensive ultrasound is standard of care for all women 35 or older at delivery.    PREGNANCY HISTORY   /Parity:    Age at Delivery: 43 year old  Gestational Age: 11w5d   KWESI: 2024, by Ultrasound             No significant complications or exposures were reported in the current pregnancy.  Hope's pregnancy history is significant for:     IAB    MEDICAL HISTORY   Hope has a history of anxiety/depression managed with Lamictal and fluoxetine.        FAMILY HISTORY   A three-generation pedigree was obtained today and is scanned under the \"Media\" tab in Epic.     The following significant findings were reported today:     Hope's brother and his partner were reported to have a stillborn. Hope believes she was on bedrest in the pregnancy and is unsure of any further details. We reviewed that in the absence of more specific information or an underlying cause, risk assessment is " challenging.     Hope reported that her father passed away in his 60's due to an aneurysm and he had a cardiac condition. Hope's paternal grandfather passed at age 55 due to stroke. Hope reported that she had twin paternal aunts, one passed at birth and the other in her 60's, however she is unsure of the specific causes. She was encouraged to share this family history information with her primary care provider to ensure appropriate screening.   Bakari's nephew was reported to have Autism. Autism is a spectrum of developmental disorders characterized by impaired social interaction, problems with verbal and nonverbal communication, and unusual, repetitive, or severely limited activities and interests.  Autism is a heterogeneous disorder, including genetic and non-genetic causes. In a small percentage of individuals with ASD, it is a feature of a certain genetic condition such as Down syndrome, fragile X syndrome, or Rett syndrome. However, in most isolated cases the cause may be multifactorial, meaning a combination of genetic and environmental factors.  Hope reported that her dad is from [facility] and she recalls her and her two paternal half brothers undergoing genetic screening, however is unsure of the specific condition. She shared she believes it was a condition that had implications if she partnered with someone who is also a carrier. We reviewed this sounds consistent with carrier screening and patient thinks it may have been for thalassemia. We reviewed availability of expanded carrier screening which can include the hemoglobinopathy conditions, see carrier screening note below. She believes one of her half brothers may be a carrier.     Otherwise, the reported family history is unremarkable for multiple miscarriages, stillbirths, birth defects, intellectual disabilities, known genetic conditions, and consanguinity.       CARRIER SCREENING   Expanded carrier screening is available to screen for autosomal recessive  conditions and X-linked conditions in a large list of genes. Autosomal recessive conditions happen when a mutation has been inherited from the egg and sperm and include conditions like cystic fibrosis, thalassemia, hearing loss, spinal muscular atrophy, and more. X-linked conditions happen when a mutation has been inherited from the egg and include conditions like fragile X syndrome. [place] screening was also reviewed.    The patient did not elect to pursue the carrier screening options discussed today, however is aware these remain available. She was provided a brochure with information about carrier screening.        RISK ASSESSMENT FOR CHROMOSOME CONDITIONS   We explained that the risk for fetal chromosome abnormalities increases with maternal age. We discussed specific features of common chromosome abnormalities, including Down syndrome, trisomy 13, trisomy 18, and sex chromosome trisomies.      At age 43 at midtrimester, the risk to have a baby with Down syndrome is 1 in 56.     At age 43 at midtrimester, the risk to have a baby with any chromosome abnormality is 1 in 31.     GENETIC TESTING OPTIONS   Genetic testing during a pregnancy includes screening and diagnostic procedures.      We discussed the following screening options:   Non-invasive prenatal testing (NIPT)    Also called cell-free DNA screening because it detects chromosomes from the placenta in the pregnant person's blood    Can be done any time after 10 weeks gestation    Screens for trisomy 21, trisomy 18, trisomy 13, and sex chromosome aneuploidies    Cannot screen for open neural tube defects, maternal serum AFP after 15 weeks is recommended    AFP only    Blood work from arm for levels of AFP (alpha-fetoprotein)    Can be done between 14w1d and 23w6d gestation    Screens for open neural tube defects like spina bifida    Recommended for those that do not want genetic testing (for chromosome conditions), those who did screening other than the  quad screen, and those with a personal or family history of neural tube defects.      We discussed the following ultrasound options:  Nuchal translucency (NT) ultrasound    Ultrasound between [id number] that includes nuchal translucency measurement and nasal bone assessments    Nuchal translucency refers to the space at the back of the neck where fluid builds up. All babies at this stage have fluid and there is only concern if there is too much fluid    Nasal bone refers to the small bone in the nose. There is concern for conditions like Down syndrome if the bone cannot be seen at all    This ultrasound can be done as part of first trimester screening, at the same time as another screen (NIPT), at the same time as a CVS, or if the patients does not want genetic screening.    Markers on ultrasound detects about 70% of pregnancies with aneuploidy    Abnormalities on NT ultrasound can also increase the risk for a birth defect, like a heart defect    Comprehensive level II ultrasound (Fetal Anatomy Ultrasound)    Ultrasound done between 18-20 weeks gestation    Screens for major birth defects and markers for aneuploidy (like trisomy 21 and trisomy 18)    Includes looking at the fetus/baby's growth, heart, organs (stomach, kidneys), placenta, and amniotic fluid      We discussed the following diagnostic options:   Chorionic villus sampling (CVS)    Invasive diagnostic procedure done between 10w0d and 13w6d    The procedure collects a small sample from the placenta for the purpose of chromosomal testing and/or other genetic testing    Diagnostic result; more than 99% sensitivity for fetal chromosome abnormalities    Cannot screen for open neural tube defects, maternal serum AFP after 15 weeks is recommended    Amniocentesis    Invasive diagnostic procedure done after 15 weeks gestation    The procedure collects a small sample of amniotic fluid for the purpose of chromosomal testing and/or other genetic  testing    Diagnostic result; more than 99% sensitivity for fetal chromosome abnormalities    Testing for AFP in the amniotic fluid can test for open neural tube defects      The benefits and limitations of noninvasive screening were reviewed. Screening tests provide a risk assessment (chance) specific to the pregnancy for certain fetal chromosome abnormalities but cannot definitively diagnose or exclude a fetal chromosome abnormality. Follow-up genetic counseling and consideration of diagnostic testing is recommended with any abnormal screening result. Diagnostic testing during a pregnancy is more certain and can test for more conditions. However, the tests do have a risk of miscarriage that requires careful consideration. These tests can detect fetal chromosome abnormalities with greater than 99% certainty. Results can be compromised by maternal cell contamination or mosaicism and are limited by the resolution of current genetic testing technology. There is no screening or diagnostic test that detects all forms of birth defects or intellectual disability.     It was a pleasure to be involved with Hope ivan. Face-to-face time of the meeting was 35 minutes.    Isa Gross MS, [facility]  Licensed Genetic Counselor  [facility]  Maternal Fetal Medicine  Ph: [phone]  [email or web address]

## 2023-06-20 NOTE — NURSING NOTE
Patient presents to M for GC/NT. Denies LOF, vaginal bleeding, cramping/contractions. SBAR given to SALVADOR MD, see their note in Epic.

## 2023-06-20 NOTE — PROGRESS NOTES
The patient was seen for an ultrasound in the Maternal-Fetal Medicine Center at the [facility] today.  For a detailed report of the ultrasound examination, please see the ultrasound report which can be found under the imaging tab.    If you have questions regarding today's evaluation or if we can be of further service, please contact the Maternal-Fetal Medicine Center.    Ana Allred MD  , OB/GYN  Maternal-Fetal Medicine  [phone] (Pager)

## 2023-06-26 ENCOUNTER — TELEPHONE (OUTPATIENT)
Dept: MATERNAL FETAL MEDICINE | Facility: CLINIC | Age: 43
End: 2023-06-26
Payer: COMMERCIAL

## 2023-06-26 LAB — SCANNED LAB RESULT: NORMAL

## 2023-06-26 NOTE — TELEPHONE ENCOUNTER
June 26, 2023    I left a message for Hope regarding her NIPT results.     Results indicate NO ANEUPLOIDY DETECTED for chromosomes 21, 18, 13, or the sex chromosomes (XX). I disclosed predicted fetal sex, per her request.     This puts her current pregnancy at low risk for Down syndrome, trisomy 18, trisomy 13 and sex chromosome abnormalities. This test is reported to have the following sensitivities: Down syndrome- >99.9%, trisomy 18- >99.9%, and trisomy 13- >99.9%. Although these results are reassuring, this does not replace a standard chromosome analysis from a chorionic villus sampling or amniocentesis.     Level II ultrasound is recommended, given AMA.     MSAFP is the appropriate second trimester screening test for open neural tube defects; the maternal quad screen is not recommended.    Her results are available in her Epic chart for her primary OB to review.    Suma Hodge MS, [facility]  Licensed Genetic Counselor  [facility]  Pager: [phone]  Office: [phone]